# Patient Record
Sex: FEMALE | Race: WHITE | NOT HISPANIC OR LATINO | Employment: FULL TIME | URBAN - NONMETROPOLITAN AREA
[De-identification: names, ages, dates, MRNs, and addresses within clinical notes are randomized per-mention and may not be internally consistent; named-entity substitution may affect disease eponyms.]

---
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
## 2020-02-02 ENCOUNTER — LAB (OUTPATIENT)
Dept: LAB | Facility: HOSPITAL | Age: 20
End: 2020-02-02

## 2020-02-02 ENCOUNTER — TRANSCRIBE ORDERS (OUTPATIENT)
Dept: ADMINISTRATIVE | Facility: HOSPITAL | Age: 20
End: 2020-02-02

## 2020-02-02 DIAGNOSIS — I51.9 MYXEDEMA HEART DISEASE: Primary | ICD-10-CM

## 2020-02-02 DIAGNOSIS — I51.9 MYXEDEMA HEART DISEASE: ICD-10-CM

## 2020-02-02 DIAGNOSIS — E03.9 MYXEDEMA HEART DISEASE: Primary | ICD-10-CM

## 2020-02-02 DIAGNOSIS — E03.9 MYXEDEMA HEART DISEASE: ICD-10-CM

## 2020-02-02 PROCEDURE — 84439 ASSAY OF FREE THYROXINE: CPT

## 2020-02-02 PROCEDURE — 36415 COLL VENOUS BLD VENIPUNCTURE: CPT

## 2020-02-02 PROCEDURE — 84443 ASSAY THYROID STIM HORMONE: CPT | Performed by: NURSE PRACTITIONER

## 2020-02-02 PROCEDURE — 84481 FREE ASSAY (FT-3): CPT

## 2020-02-03 LAB
T3FREE SERPL-MCNC: 2.1 PG/ML (ref 2–4.4)
T4 FREE SERPL-MCNC: 0.96 NG/DL (ref 0.93–1.7)
TSH SERPL DL<=0.05 MIU/L-ACNC: 7.72 UIU/ML (ref 0.27–4.2)

## 2020-02-21 ENCOUNTER — TRANSCRIBE ORDERS (OUTPATIENT)
Dept: ADMINISTRATIVE | Facility: HOSPITAL | Age: 20
End: 2020-02-21

## 2020-02-21 DIAGNOSIS — E03.9 HYPOTHYROIDISM, ADULT: Primary | ICD-10-CM

## 2020-03-09 ENCOUNTER — TRANSCRIBE ORDERS (OUTPATIENT)
Dept: ADMINISTRATIVE | Facility: HOSPITAL | Age: 20
End: 2020-03-09

## 2020-03-09 ENCOUNTER — LAB (OUTPATIENT)
Dept: LAB | Facility: HOSPITAL | Age: 20
End: 2020-03-09

## 2020-03-09 ENCOUNTER — HOSPITAL ENCOUNTER (OUTPATIENT)
Dept: GENERAL RADIOLOGY | Facility: HOSPITAL | Age: 20
Discharge: HOME | End: 2020-03-09
Admitting: NURSE PRACTITIONER

## 2020-03-09 ENCOUNTER — HOSPITAL ENCOUNTER (OUTPATIENT)
Dept: RESPIRATORY THERAPY | Facility: HOSPITAL | Age: 20
Discharge: HOME | End: 2020-03-09

## 2020-03-09 DIAGNOSIS — I51.9 MYXEDEMA HEART DISEASE: ICD-10-CM

## 2020-03-09 DIAGNOSIS — R53.83 TIREDNESS: ICD-10-CM

## 2020-03-09 DIAGNOSIS — R07.9 CHEST PAIN, UNSPECIFIED TYPE: ICD-10-CM

## 2020-03-09 DIAGNOSIS — R63.5 ABNORMAL WEIGHT GAIN: ICD-10-CM

## 2020-03-09 DIAGNOSIS — R10.11 ABDOMINAL PAIN, RIGHT UPPER QUADRANT: ICD-10-CM

## 2020-03-09 DIAGNOSIS — R10.13 ABDOMINAL PAIN, EPIGASTRIC: ICD-10-CM

## 2020-03-09 DIAGNOSIS — E03.9 MYXEDEMA HEART DISEASE: ICD-10-CM

## 2020-03-09 DIAGNOSIS — R19.7 DIARRHEA OF PRESUMED INFECTIOUS ORIGIN: ICD-10-CM

## 2020-03-09 DIAGNOSIS — R00.2 PALPITATIONS: Primary | ICD-10-CM

## 2020-03-09 DIAGNOSIS — R00.2 PALPITATIONS: ICD-10-CM

## 2020-03-09 PROCEDURE — 85045 AUTOMATED RETICULOCYTE COUNT: CPT

## 2020-03-09 PROCEDURE — 93226 XTRNL ECG REC<48 HR SCAN A/R: CPT

## 2020-03-09 PROCEDURE — 82150 ASSAY OF AMYLASE: CPT

## 2020-03-09 PROCEDURE — 71046 X-RAY EXAM CHEST 2 VIEWS: CPT

## 2020-03-09 PROCEDURE — 84436 ASSAY OF TOTAL THYROXINE: CPT

## 2020-03-09 PROCEDURE — 86140 C-REACTIVE PROTEIN: CPT | Performed by: NURSE PRACTITIONER

## 2020-03-09 PROCEDURE — 85025 COMPLETE CBC W/AUTO DIFF WBC: CPT

## 2020-03-09 PROCEDURE — 82746 ASSAY OF FOLIC ACID SERUM: CPT

## 2020-03-09 PROCEDURE — 83540 ASSAY OF IRON: CPT

## 2020-03-09 PROCEDURE — 84443 ASSAY THYROID STIM HORMONE: CPT

## 2020-03-09 PROCEDURE — 84466 ASSAY OF TRANSFERRIN: CPT

## 2020-03-09 PROCEDURE — 71046 X-RAY EXAM CHEST 2 VIEWS: CPT | Performed by: RADIOLOGY

## 2020-03-09 PROCEDURE — 80053 COMPREHEN METABOLIC PANEL: CPT

## 2020-03-09 PROCEDURE — 85652 RBC SED RATE AUTOMATED: CPT | Performed by: NURSE PRACTITIONER

## 2020-03-09 PROCEDURE — 93225 XTRNL ECG REC<48 HRS REC: CPT

## 2020-03-09 PROCEDURE — 82728 ASSAY OF FERRITIN: CPT

## 2020-03-09 PROCEDURE — 36415 COLL VENOUS BLD VENIPUNCTURE: CPT

## 2020-03-09 PROCEDURE — 83690 ASSAY OF LIPASE: CPT

## 2020-03-09 PROCEDURE — 82607 VITAMIN B-12: CPT

## 2020-03-10 LAB
ALBUMIN SERPL-MCNC: 4.6 G/DL (ref 3.5–5.2)
ALBUMIN/GLOB SERPL: 1.8 G/DL
ALP SERPL-CCNC: 76 U/L (ref 39–117)
ALT SERPL W P-5'-P-CCNC: 7 U/L (ref 1–33)
AMYLASE SERPL-CCNC: 37 U/L (ref 28–100)
ANION GAP SERPL CALCULATED.3IONS-SCNC: 11.8 MMOL/L (ref 5–15)
AST SERPL-CCNC: 16 U/L (ref 1–32)
BASOPHILS # BLD AUTO: 0.04 10*3/MM3 (ref 0–0.2)
BASOPHILS NFR BLD AUTO: 0.6 % (ref 0–1.5)
BILIRUB SERPL-MCNC: 0.3 MG/DL (ref 0.2–1.2)
BUN BLD-MCNC: 12 MG/DL (ref 6–20)
BUN/CREAT SERPL: 17.4 (ref 7–25)
CALCIUM SPEC-SCNC: 9.1 MG/DL (ref 8.6–10.5)
CHLORIDE SERPL-SCNC: 105 MMOL/L (ref 98–107)
CO2 SERPL-SCNC: 22.2 MMOL/L (ref 22–29)
CREAT BLD-MCNC: 0.69 MG/DL (ref 0.57–1)
CRP SERPL-MCNC: <0.03 MG/DL (ref 0–0.5)
DEPRECATED RDW RBC AUTO: 38.5 FL (ref 37–54)
EOSINOPHIL # BLD AUTO: 0.05 10*3/MM3 (ref 0–0.4)
EOSINOPHIL NFR BLD AUTO: 0.7 % (ref 0.3–6.2)
ERYTHROCYTE [DISTWIDTH] IN BLOOD BY AUTOMATED COUNT: 12.2 % (ref 12.3–15.4)
ERYTHROCYTE [SEDIMENTATION RATE] IN BLOOD: 2 MM/HR (ref 0–20)
FERRITIN SERPL-MCNC: 15.4 NG/ML (ref 13–150)
FOLATE SERPL-MCNC: 11 NG/ML (ref 4.78–24.2)
GFR SERPL CREATININE-BSD FRML MDRD: 110 ML/MIN/1.73
GLOBULIN UR ELPH-MCNC: 2.5 GM/DL
GLUCOSE BLD-MCNC: 84 MG/DL (ref 65–99)
HCT VFR BLD AUTO: 37.2 % (ref 34–46.6)
HGB BLD-MCNC: 12.1 G/DL (ref 12–15.9)
IMM GRANULOCYTES # BLD AUTO: 0.01 10*3/MM3 (ref 0–0.05)
IMM GRANULOCYTES NFR BLD AUTO: 0.1 % (ref 0–0.5)
IRON 24H UR-MRATE: 31 MCG/DL (ref 37–145)
IRON SATN MFR SERPL: 8 % (ref 20–50)
LIPASE SERPL-CCNC: 26 U/L (ref 13–60)
LYMPHOCYTES # BLD AUTO: 2.02 10*3/MM3 (ref 0.7–3.1)
LYMPHOCYTES NFR BLD AUTO: 29.3 % (ref 19.6–45.3)
MCH RBC QN AUTO: 28.3 PG (ref 26.6–33)
MCHC RBC AUTO-ENTMCNC: 32.5 G/DL (ref 31.5–35.7)
MCV RBC AUTO: 86.9 FL (ref 79–97)
MONOCYTES # BLD AUTO: 0.58 10*3/MM3 (ref 0.1–0.9)
MONOCYTES NFR BLD AUTO: 8.4 % (ref 5–12)
NEUTROPHILS # BLD AUTO: 4.2 10*3/MM3 (ref 1.7–7)
NEUTROPHILS NFR BLD AUTO: 60.9 % (ref 42.7–76)
NRBC BLD AUTO-RTO: 0 /100 WBC (ref 0–0.2)
PLATELET # BLD AUTO: 253 10*3/MM3 (ref 140–450)
PMV BLD AUTO: 10 FL (ref 6–12)
POTASSIUM BLD-SCNC: 3.7 MMOL/L (ref 3.5–5.2)
PROT SERPL-MCNC: 7.1 G/DL (ref 6–8.5)
RBC # BLD AUTO: 4.28 10*6/MM3 (ref 3.77–5.28)
RETICS # AUTO: 0.05 10*6/MM3 (ref 0.02–0.13)
RETICS/RBC NFR AUTO: 1.07 % (ref 0.7–1.9)
SODIUM BLD-SCNC: 139 MMOL/L (ref 136–145)
T4 SERPL-MCNC: 6.58 MCG/DL (ref 4.5–11.7)
TIBC SERPL-MCNC: 408 MCG/DL (ref 298–536)
TRANSFERRIN SERPL-MCNC: 274 MG/DL (ref 200–360)
TSH SERPL DL<=0.05 MIU/L-ACNC: 2.44 UIU/ML (ref 0.27–4.2)
VIT B12 BLD-MCNC: 653 PG/ML (ref 211–946)
WBC NRBC COR # BLD: 6.9 10*3/MM3 (ref 3.4–10.8)

## 2020-03-11 ENCOUNTER — TRANSCRIBE ORDERS (OUTPATIENT)
Dept: ADMINISTRATIVE | Facility: HOSPITAL | Age: 20
End: 2020-03-11

## 2020-03-11 DIAGNOSIS — R10.9 ABDOMINAL PAIN, UNSPECIFIED ABDOMINAL LOCATION: ICD-10-CM

## 2020-03-11 DIAGNOSIS — R00.2 PALPITATIONS: Primary | ICD-10-CM

## 2020-03-13 PROCEDURE — 93227 XTRNL ECG REC<48 HR R&I: CPT | Performed by: INTERNAL MEDICINE

## 2020-03-20 ENCOUNTER — TRANSCRIBE ORDERS (OUTPATIENT)
Dept: ADMINISTRATIVE | Facility: HOSPITAL | Age: 20
End: 2020-03-20

## 2020-03-20 DIAGNOSIS — I49.3 PVC (PREMATURE VENTRICULAR CONTRACTION): Primary | ICD-10-CM

## 2020-03-23 ENCOUNTER — APPOINTMENT (OUTPATIENT)
Dept: ULTRASOUND IMAGING | Facility: HOSPITAL | Age: 20
End: 2020-03-23

## 2020-03-23 ENCOUNTER — APPOINTMENT (OUTPATIENT)
Dept: CARDIOLOGY | Facility: HOSPITAL | Age: 20
End: 2020-03-23

## 2020-03-25 ENCOUNTER — HOSPITAL ENCOUNTER (OUTPATIENT)
Dept: ULTRASOUND IMAGING | Facility: HOSPITAL | Age: 20
Discharge: HOME | End: 2020-03-25

## 2020-03-25 ENCOUNTER — HOSPITAL ENCOUNTER (OUTPATIENT)
Dept: CARDIOLOGY | Facility: HOSPITAL | Age: 20
Discharge: HOME | End: 2020-03-25
Admitting: NURSE PRACTITIONER

## 2020-03-25 DIAGNOSIS — R10.9 ABDOMINAL PAIN, UNSPECIFIED ABDOMINAL LOCATION: ICD-10-CM

## 2020-03-25 DIAGNOSIS — R00.2 PALPITATIONS: ICD-10-CM

## 2020-03-25 LAB
BH CV ECHO MEAS - % IVS THICK: 16.4 %
BH CV ECHO MEAS - % LVPW THICK: 30.1 %
BH CV ECHO MEAS - ACS: 2.4 CM
BH CV ECHO MEAS - AO MAX PG: 7.6 MMHG
BH CV ECHO MEAS - AO MEAN PG: 3 MMHG
BH CV ECHO MEAS - AO ROOT AREA (BSA CORRECTED): 1.8
BH CV ECHO MEAS - AO ROOT AREA: 6.2 CM^2
BH CV ECHO MEAS - AO ROOT DIAM: 2.8 CM
BH CV ECHO MEAS - AO V2 MAX: 138 CM/SEC
BH CV ECHO MEAS - AO V2 MEAN: 85.5 CM/SEC
BH CV ECHO MEAS - AO V2 VTI: 32.7 CM
BH CV ECHO MEAS - BSA(HAYCOCK): 1.5 M^2
BH CV ECHO MEAS - BSA: 1.5 M^2
BH CV ECHO MEAS - BZI_BMI: 22.7 KILOGRAMS/M^2
BH CV ECHO MEAS - BZI_METRIC_HEIGHT: 154.9 CM
BH CV ECHO MEAS - BZI_METRIC_WEIGHT: 54.4 KG
BH CV ECHO MEAS - EDV(CUBED): 75.7 ML
BH CV ECHO MEAS - EDV(MOD-SP4): 49.9 ML
BH CV ECHO MEAS - EDV(TEICH): 79.9 ML
BH CV ECHO MEAS - EF(CUBED): 82.3 %
BH CV ECHO MEAS - EF(MOD-SP4): 70.7 %
BH CV ECHO MEAS - EF(TEICH): 75.4 %
BH CV ECHO MEAS - ESV(CUBED): 13.4 ML
BH CV ECHO MEAS - ESV(MOD-SP4): 14.6 ML
BH CV ECHO MEAS - ESV(TEICH): 19.6 ML
BH CV ECHO MEAS - FS: 43.9 %
BH CV ECHO MEAS - IVS/LVPW: 1
BH CV ECHO MEAS - IVSD: 0.87 CM
BH CV ECHO MEAS - IVSS: 1 CM
BH CV ECHO MEAS - LA DIMENSION: 2.2 CM
BH CV ECHO MEAS - LA/AO: 0.77
BH CV ECHO MEAS - LV DIASTOLIC VOL/BSA (35-75): 32.8 ML/M^2
BH CV ECHO MEAS - LV MASS(C)D: 112.3 GRAMS
BH CV ECHO MEAS - LV MASS(C)DI: 73.8 GRAMS/M^2
BH CV ECHO MEAS - LV MASS(C)S: 64.5 GRAMS
BH CV ECHO MEAS - LV MASS(C)SI: 42.4 GRAMS/M^2
BH CV ECHO MEAS - LV SYSTOLIC VOL/BSA (12-30): 9.6 ML/M^2
BH CV ECHO MEAS - LVIDD: 4.2 CM
BH CV ECHO MEAS - LVIDS: 2.4 CM
BH CV ECHO MEAS - LVLD AP4: 6.3 CM
BH CV ECHO MEAS - LVLS AP4: 5.3 CM
BH CV ECHO MEAS - LVOT AREA (M): 2.5 CM^2
BH CV ECHO MEAS - LVOT AREA: 2.5 CM^2
BH CV ECHO MEAS - LVOT DIAM: 1.8 CM
BH CV ECHO MEAS - LVPWD: 0.85 CM
BH CV ECHO MEAS - LVPWS: 1.1 CM
BH CV ECHO MEAS - MV A MAX VEL: 33.8 CM/SEC
BH CV ECHO MEAS - MV E MAX VEL: 102 CM/SEC
BH CV ECHO MEAS - MV E/A: 3
BH CV ECHO MEAS - PA ACC TIME: 0.2 SEC
BH CV ECHO MEAS - PA PR(ACCEL): -12.8 MMHG
BH CV ECHO MEAS - RAP SYSTOLE: 10 MMHG
BH CV ECHO MEAS - RVSP: 32.5 MMHG
BH CV ECHO MEAS - SI(AO): 132.4 ML/M^2
BH CV ECHO MEAS - SI(CUBED): 41 ML/M^2
BH CV ECHO MEAS - SI(MOD-SP4): 23.2 ML/M^2
BH CV ECHO MEAS - SI(TEICH): 39.6 ML/M^2
BH CV ECHO MEAS - SV(AO): 201.4 ML
BH CV ECHO MEAS - SV(CUBED): 62.3 ML
BH CV ECHO MEAS - SV(MOD-SP4): 35.3 ML
BH CV ECHO MEAS - SV(TEICH): 60.3 ML
BH CV ECHO MEAS - TR MAX VEL: 236.7 CM/SEC
MAXIMAL PREDICTED HEART RATE: 201 BPM
STRESS TARGET HR: 171 BPM

## 2020-03-25 PROCEDURE — 76700 US EXAM ABDOM COMPLETE: CPT | Performed by: RADIOLOGY

## 2020-03-25 PROCEDURE — 93306 TTE W/DOPPLER COMPLETE: CPT | Performed by: INTERNAL MEDICINE

## 2020-03-25 PROCEDURE — 93306 TTE W/DOPPLER COMPLETE: CPT

## 2020-03-25 PROCEDURE — 76700 US EXAM ABDOM COMPLETE: CPT

## 2020-04-20 ENCOUNTER — HOSPITAL ENCOUNTER (OUTPATIENT)
Dept: CARDIOLOGY | Facility: HOSPITAL | Age: 20
Discharge: HOME | End: 2020-04-20

## 2020-04-20 VITALS — BODY MASS INDEX: 22.84 KG/M2 | HEIGHT: 61 IN | WEIGHT: 121 LBS

## 2020-04-20 DIAGNOSIS — I49.3 PVC (PREMATURE VENTRICULAR CONTRACTION): ICD-10-CM

## 2020-04-20 PROCEDURE — 93017 CV STRESS TEST TRACING ONLY: CPT

## 2020-04-21 LAB
BH CV STRESS BP STAGE 1: NORMAL
BH CV STRESS BP STAGE 2: NORMAL
BH CV STRESS BP STAGE 3: NORMAL
BH CV STRESS BP STAGE 4: NORMAL
BH CV STRESS DURATION MIN STAGE 1: 3
BH CV STRESS DURATION MIN STAGE 2: 3
BH CV STRESS DURATION MIN STAGE 3: 3
BH CV STRESS DURATION MIN STAGE 4: 3
BH CV STRESS DURATION SEC STAGE 1: 0
BH CV STRESS DURATION SEC STAGE 2: 0
BH CV STRESS DURATION SEC STAGE 3: 0
BH CV STRESS DURATION SEC STAGE 4: 0
BH CV STRESS GRADE STAGE 1: 10
BH CV STRESS GRADE STAGE 2: 12
BH CV STRESS GRADE STAGE 3: 14
BH CV STRESS GRADE STAGE 4: 16
BH CV STRESS HR STAGE 1: 111
BH CV STRESS HR STAGE 2: 125
BH CV STRESS HR STAGE 3: 153
BH CV STRESS HR STAGE 4: 171
BH CV STRESS METS STAGE 1: 5
BH CV STRESS METS STAGE 2: 7.5
BH CV STRESS METS STAGE 3: 10
BH CV STRESS METS STAGE 4: 13.5
BH CV STRESS PROTOCOL 1: NORMAL
BH CV STRESS RECOVERY BP: NORMAL MMHG
BH CV STRESS RECOVERY HR: 77 BPM
BH CV STRESS SPEED STAGE 1: 1.7
BH CV STRESS SPEED STAGE 2: 2.5
BH CV STRESS SPEED STAGE 3: 3.4
BH CV STRESS SPEED STAGE 4: 4.2
BH CV STRESS STAGE 1: 1
BH CV STRESS STAGE 2: 2
BH CV STRESS STAGE 3: 3
BH CV STRESS STAGE 4: 4
MAXIMAL PREDICTED HEART RATE: 201 BPM
PERCENT MAX PREDICTED HR: 85.07 %
STRESS BASELINE BP: NORMAL MMHG
STRESS BASELINE HR: 68 BPM
STRESS PERCENT HR: 100 %
STRESS POST ESTIMATED WORKLOAD: 12.8 METS
STRESS POST EXERCISE DUR MIN: 11 MIN
STRESS POST EXERCISE DUR SEC: 0 SEC
STRESS POST PEAK BP: NORMAL MMHG
STRESS POST PEAK HR: 171 BPM
STRESS TARGET HR: 171 BPM

## 2020-10-29 ENCOUNTER — TRANSCRIBE ORDERS (OUTPATIENT)
Dept: ADMINISTRATIVE | Facility: HOSPITAL | Age: 20
End: 2020-10-29

## 2020-10-29 DIAGNOSIS — Z01.818 PREOP EXAMINATION: Primary | ICD-10-CM

## 2020-10-30 ENCOUNTER — LAB (OUTPATIENT)
Dept: LAB | Facility: HOSPITAL | Age: 20
End: 2020-10-30

## 2020-10-30 DIAGNOSIS — Z01.818 PREOP EXAMINATION: ICD-10-CM

## 2020-10-30 LAB — SARS-COV-2 RNA RESP QL NAA+PROBE: NOT DETECTED

## 2020-10-30 PROCEDURE — C9803 HOPD COVID-19 SPEC COLLECT: HCPCS

## 2020-10-30 PROCEDURE — U0004 COV-19 TEST NON-CDC HGH THRU: HCPCS | Performed by: INTERNAL MEDICINE

## 2021-02-08 ENCOUNTER — TRANSCRIBE ORDERS (OUTPATIENT)
Dept: ADMINISTRATIVE | Facility: HOSPITAL | Age: 21
End: 2021-02-08

## 2021-02-08 ENCOUNTER — LAB (OUTPATIENT)
Dept: LAB | Facility: HOSPITAL | Age: 21
End: 2021-02-08

## 2021-02-08 DIAGNOSIS — E03.9 HYPOTHYROIDISM, UNSPECIFIED TYPE: ICD-10-CM

## 2021-02-08 DIAGNOSIS — E03.9 HYPOTHYROIDISM, UNSPECIFIED TYPE: Primary | ICD-10-CM

## 2021-02-08 PROCEDURE — 36415 COLL VENOUS BLD VENIPUNCTURE: CPT | Performed by: NURSE PRACTITIONER

## 2021-02-08 PROCEDURE — 84443 ASSAY THYROID STIM HORMONE: CPT

## 2021-02-08 PROCEDURE — 84439 ASSAY OF FREE THYROXINE: CPT | Performed by: NURSE PRACTITIONER

## 2021-02-09 LAB
T4 FREE SERPL-MCNC: 2.76 NG/DL (ref 0.93–1.7)
TSH SERPL DL<=0.05 MIU/L-ACNC: 0.01 UIU/ML (ref 0.27–4.2)

## 2021-03-18 ENCOUNTER — BULK ORDERING (OUTPATIENT)
Dept: CASE MANAGEMENT | Facility: OTHER | Age: 21
End: 2021-03-18

## 2021-03-18 DIAGNOSIS — Z23 IMMUNIZATION DUE: ICD-10-CM

## 2021-10-08 ENCOUNTER — TRANSCRIBE ORDERS (OUTPATIENT)
Dept: ADMINISTRATIVE | Facility: HOSPITAL | Age: 21
End: 2021-10-08

## 2021-10-08 DIAGNOSIS — R51.9 NONINTRACTABLE EPISODIC HEADACHE, UNSPECIFIED HEADACHE TYPE: Primary | ICD-10-CM

## 2021-10-27 ENCOUNTER — HOSPITAL ENCOUNTER (OUTPATIENT)
Dept: MRI IMAGING | Facility: HOSPITAL | Age: 21
End: 2021-10-27

## 2021-11-22 ENCOUNTER — APPOINTMENT (OUTPATIENT)
Dept: MRI IMAGING | Facility: HOSPITAL | Age: 21
End: 2021-11-22

## 2021-12-13 ENCOUNTER — APPOINTMENT (OUTPATIENT)
Dept: MRI IMAGING | Facility: HOSPITAL | Age: 21
End: 2021-12-13

## 2021-12-13 ENCOUNTER — HOSPITAL ENCOUNTER (OUTPATIENT)
Dept: INFUSION THERAPY | Facility: HOSPITAL | Age: 21
Discharge: HOME | End: 2021-12-13
Admitting: NURSE PRACTITIONER

## 2021-12-13 VITALS
SYSTOLIC BLOOD PRESSURE: 118 MMHG | OXYGEN SATURATION: 100 % | RESPIRATION RATE: 18 BRPM | DIASTOLIC BLOOD PRESSURE: 86 MMHG | HEART RATE: 56 BPM | TEMPERATURE: 98.6 F

## 2021-12-13 DIAGNOSIS — U07.1 COVID-19: Primary | ICD-10-CM

## 2021-12-13 PROCEDURE — M0245 HC IV INFUSION, BAMLANIVIMAB AND ETESEVIMAB, 2100 MG: HCPCS | Performed by: NURSE PRACTITIONER

## 2021-12-13 PROCEDURE — 25010000002 INJECTION, BAMLANIVIMAB AND ETESEVIMAB, 2100 MG: Performed by: NURSE PRACTITIONER

## 2021-12-13 RX ORDER — EPINEPHRINE 1 MG/ML
0.3 INJECTION, SOLUTION INTRAMUSCULAR; SUBCUTANEOUS AS NEEDED
OUTPATIENT
Start: 2021-12-13

## 2021-12-13 RX ORDER — DIPHENHYDRAMINE HYDROCHLORIDE 50 MG/ML
50 INJECTION INTRAMUSCULAR; INTRAVENOUS ONCE AS NEEDED
OUTPATIENT
Start: 2021-12-13

## 2021-12-13 RX ORDER — DIPHENHYDRAMINE HCL 50 MG
50 CAPSULE ORAL ONCE AS NEEDED
OUTPATIENT
Start: 2021-12-13

## 2021-12-13 RX ORDER — METHYLPREDNISOLONE SODIUM SUCCINATE 125 MG/2ML
125 INJECTION, POWDER, LYOPHILIZED, FOR SOLUTION INTRAMUSCULAR; INTRAVENOUS AS NEEDED
OUTPATIENT
Start: 2021-12-13

## 2021-12-13 RX ADMIN — SODIUM CHLORIDE: 9 INJECTION, SOLUTION INTRAVENOUS at 13:24

## 2021-12-13 NOTE — NURSING NOTE
Admitted ambulatory to room 4 for monoclonal antibody infusion following a positive Covid test and mild symptoms.

## 2021-12-27 ENCOUNTER — HOSPITAL ENCOUNTER (OUTPATIENT)
Dept: MRI IMAGING | Facility: HOSPITAL | Age: 21
Discharge: HOME | End: 2021-12-27
Admitting: NURSE PRACTITIONER

## 2021-12-27 DIAGNOSIS — R51.9 NONINTRACTABLE EPISODIC HEADACHE, UNSPECIFIED HEADACHE TYPE: ICD-10-CM

## 2021-12-27 PROCEDURE — 70551 MRI BRAIN STEM W/O DYE: CPT

## 2021-12-27 PROCEDURE — 70551 MRI BRAIN STEM W/O DYE: CPT | Performed by: RADIOLOGY

## 2023-12-18 ENCOUNTER — TRANSCRIBE ORDERS (OUTPATIENT)
Dept: ADMINISTRATIVE | Facility: HOSPITAL | Age: 23
End: 2023-12-18
Payer: COMMERCIAL

## 2023-12-18 DIAGNOSIS — N05.9 NEPHRITIC SYNDROME: Primary | ICD-10-CM

## 2023-12-26 ENCOUNTER — HOSPITAL ENCOUNTER (OUTPATIENT)
Dept: ULTRASOUND IMAGING | Facility: HOSPITAL | Age: 23
Discharge: HOME | End: 2023-12-26
Admitting: NURSE PRACTITIONER
Payer: COMMERCIAL

## 2023-12-26 DIAGNOSIS — N05.9 NEPHRITIC SYNDROME: ICD-10-CM

## 2023-12-26 PROCEDURE — 76536 US EXAM OF HEAD AND NECK: CPT

## 2024-03-11 ENCOUNTER — OFFICE VISIT (OUTPATIENT)
Dept: CARDIOLOGY | Facility: CLINIC | Age: 24
End: 2024-03-11
Payer: COMMERCIAL

## 2024-03-11 VITALS
SYSTOLIC BLOOD PRESSURE: 134 MMHG | HEART RATE: 45 BPM | RESPIRATION RATE: 16 BRPM | OXYGEN SATURATION: 99 % | HEIGHT: 61 IN | DIASTOLIC BLOOD PRESSURE: 82 MMHG | BODY MASS INDEX: 25.34 KG/M2 | WEIGHT: 134.2 LBS

## 2024-03-11 DIAGNOSIS — F17.290 VAPING NICOTINE DEPENDENCE, TOBACCO PRODUCT: ICD-10-CM

## 2024-03-11 DIAGNOSIS — Z82.49 FAMILY HISTORY OF CORONARY ARTERY DISEASE: ICD-10-CM

## 2024-03-11 DIAGNOSIS — R00.2 PALPITATIONS: Primary | ICD-10-CM

## 2024-03-11 DIAGNOSIS — R06.02 SHORTNESS OF BREATH: ICD-10-CM

## 2024-03-11 DIAGNOSIS — G47.33 OSA (OBSTRUCTIVE SLEEP APNEA): ICD-10-CM

## 2024-03-11 DIAGNOSIS — I10 ESSENTIAL HYPERTENSION: ICD-10-CM

## 2024-03-11 DIAGNOSIS — R07.2 PRECORDIAL CHEST PAIN: ICD-10-CM

## 2024-03-11 DIAGNOSIS — E03.9 HYPOTHYROIDISM (ACQUIRED): ICD-10-CM

## 2024-03-11 DIAGNOSIS — I49.3 PVC'S (PREMATURE VENTRICULAR CONTRACTIONS): ICD-10-CM

## 2024-03-11 DIAGNOSIS — R00.1 BRADYCARDIA, SINUS: ICD-10-CM

## 2024-03-11 PROCEDURE — 99204 OFFICE O/P NEW MOD 45 MIN: CPT | Performed by: SPECIALIST

## 2024-03-11 PROCEDURE — 93000 ELECTROCARDIOGRAM COMPLETE: CPT | Performed by: SPECIALIST

## 2024-03-11 NOTE — PROGRESS NOTES
Subjective   Initial consultation, Sinus bradycardia, PVCs, chest pain  Tayla Lantigua is a 23 y.o. female who presents to day for Palpitations (Bradycardia, recent holter scanned in system), Chest Pain, and Med Management (verbal).    CHIEF COMPLIANT  Chief Complaint   Patient presents with    Palpitations     Bradycardia, recent holter scanned in system    Chest Pain    Med Management     verbal       Active Problems:  Problem List Items Addressed This Visit          Cardiac and Vasculature    Palpitations - Primary    Relevant Orders    Adult Transthoracic Echo Complete w/ Color, Spectral and Contrast if necessary per protocol    PVC's (premature ventricular contractions)    Relevant Orders    Adult Transthoracic Echo Complete w/ Color, Spectral and Contrast if necessary per protocol    Precordial chest pain    Relevant Orders    Adult Transthoracic Echo Complete w/ Color, Spectral and Contrast if necessary per protocol    Treadmill Stress Test    NMR LipoProfile    Comprehensive Metabolic Panel    Essential hypertension    Bradycardia, sinus    Relevant Orders    TSH       Endocrine and Metabolic    Hypothyroidism (acquired)    Relevant Orders    TSH       Family History    Family history of coronary artery disease    Relevant Orders    NMR LipoProfile       Pulmonary and Pneumonias    Shortness of breath    Relevant Orders    Adult Transthoracic Echo Complete w/ Color, Spectral and Contrast if necessary per protocol       Sleep    KIRILL (obstructive sleep apnea)    Relevant Orders    Home Sleep Study       Other    Vaping nicotine dependence, tobacco product       HPI  HPI  Patient for 4 years and been having intermittent chest pains and is sinus bradycardia she was originally seen by Dr. Hester who told her she that she has PVCs but no intervention was done the patient is more concerned now because she gets frequent chest discomfort mostly about maybe 10 times a week mostly at rest nothing increasing getting worse  but she still headache and little bit of shortness of breath in between when she is not having chest pain she is asymptomatic except for palpitation when she feels her heart skipping the patient also said that she is relatively active with she is active she does not have any cardiac symptoms, she does vape but she has also borderline hypertension no history of diabetes or asthma unknown history of hyperlipidemia she states she is fatigued she is tired she is sleepy and she snores a little bit at night sometimes family history wise both grandparents on both sides with coronary artery disease including PCI's and pacemakers both the also would have one of them had a heart attack at age 42  PRIOR MEDS  Current Outpatient Medications on File Prior to Visit   Medication Sig Dispense Refill    levothyroxine (SYNTHROID, LEVOTHROID) 88 MCG tablet Take 1 tablet by mouth Daily. 30 tablet 5    amoxicillin-clavulanate (AUGMENTIN) 500-125 MG per tablet Take 1 tablet by mouth Every 12 (Twelve) Hours for 10 days. 20 tablet 0    azithromycin (ZITHROMAX) 250 MG tablet TAKE 2 TABLETS BY MOUTH TODAY AND THEN 1 TABLET BY MOUTH DAILY X4DAYS. 6 tablet 0    azithromycin (ZITHROMAX) 250 MG tablet Take 2 tablets by mouth the first day, then 1 tablet daily for 4 days. 6 tablet 0    brompheniramine-pseudoephedrine-DM 30-2-10 MG/5ML syrup Take 1 teaspoonful (5 mL) by mouth Every 6 (Six) Hours As Needed for cough. 120 mL 0    brompheniramine-pseudoephedrine-DM 30-2-10 MG/5ML syrup Take 5 mL by mouth Every 6 (Six) Hours As Needed for cough and congestion. 240 mL 0    dicyclomine (BENTYL) 20 MG tablet Take 1 tablet by mouth before meals and at bedtime 120 tablet 1    ferrous sulfate 325 (65 FE) MG tablet Take 1 tablet by mouth daily 30 tablet 1    ferrous sulfate 325 (65 FE) MG tablet Take 1 tablet by mouth 2 times per day 60 tablet 1    levothyroxine (Synthroid) 175 MCG tablet Take 1 tablet (175 mcg) by mouth once daily. 30 tablet 0     levothyroxine (SYNTHROID, LEVOTHROID) 100 MCG tablet Take 1 tablet by mouth daily. 30 tablet 2    levothyroxine (SYNTHROID, LEVOTHROID) 100 MCG tablet Take 1 tablet by mouth daily 30 tablet 5    levothyroxine (SYNTHROID, LEVOTHROID) 112 MCG tablet Take 1 tablet(s) by mouth daily 30 tablet 5    levothyroxine (SYNTHROID, LEVOTHROID) 125 MCG tablet Take 1 tablet by mouth Daily to treat hypothyroydism. 30 tablet 5    levothyroxine (SYNTHROID, LEVOTHROID) 125 MCG tablet Take 1 tablet by mouth Daily. 30 tablet 5    levothyroxine (SYNTHROID, LEVOTHROID) 137 MCG tablet Take 1 tablet by mouth Daily. 30 tablet 5    levothyroxine (SYNTHROID, LEVOTHROID) 150 MCG tablet Take 1 tablet by mouth Daily. 30 tablet 1    levothyroxine (SYNTHROID, LEVOTHROID) 150 MCG tablet Take 1 tablet by mouth Daily. 90 tablet 1    levothyroxine (SYNTHROID, LEVOTHROID) 150 MCG tablet take 1 tablet by mouth once daily.  Brand name only. 30 tablet 3    Synthroid 150 MCG tablet Take 1 tablet by mouth on Saturday and Sunday (Brand Name Only).  Alternate with 137 mcg Monday-Friday. 30 tablet 1    metaxalone (SKELAXIN) 800 MG tablet Take 1/2-1 tablet by mouth two times daily as needed for muscle spasms 60 tablet 0    naproxen (NAPROSYN) 500 MG tablet Take 1/2 tablet by mouth 2 (two) times a day as needed for pain. 30 tablet 0    norethindrone-ethinyl estradiol (JUNEL FE 1/20) 1-20 MG-MCG per tablet take 1 tablet by mouth daily 28 tablet 4    norethindrone-ethinyl estradiol FE (JUNEL FE 1/20) 1-20 MG-MCG per tablet Take 1 tablet by mouth every day 28 tablet 4    norethindrone-ethinyl estradiol-iron (MICROGESTIN FE1.5/30) 1.5-30 MG-MCG tablet Take 1 tablet by mouth Daily. 28 tablet 8    norgestimate-ethinyl estradiol (SPRINTEC 28) 0.25-35 MG-MCG per tablet Take 1 tablet by mouth Daily. 28 tablet 7    SYNTHROID 112 MCG tablet Take 1 tablet by mouth daily. 90 tablet 1     No current facility-administered medications on file prior to visit.  "      ALLERGIES  Bactrim [sulfamethoxazole-trimethoprim]    HISTORY  Past Medical History:   Diagnosis Date    Disease of thyroid gland     Essential hypertension 3/11/2024       Social History     Socioeconomic History    Marital status: Single   Tobacco Use    Smoking status: Former    Tobacco comments:     vapes   Substance and Sexual Activity    Alcohol use: Never    Drug use: Never       Family History   Problem Relation Age of Onset    Heart disease Maternal Grandfather     Heart attack Paternal Grandfather     Heart disease Paternal Grandfather        Review of Systems   Respiratory:  Positive for chest tightness and shortness of breath. Negative for apnea, cough, choking, wheezing and stridor.    Cardiovascular:  Positive for palpitations. Negative for chest pain and leg swelling.       Objective     VITALS: /82 (BP Location: Right arm, Cuff Size: Adult)   Pulse (!) 45   Resp 16   Ht 154.9 cm (61\")   Wt 60.9 kg (134 lb 3.2 oz)   SpO2 99%   BMI 25.36 kg/m²     LABS:   Lab Results (most recent)       None            IMAGING:   US Thyroid    Result Date: 12/26/2023    No acute thyroid findings.   This report was finalized on 12/26/2023 5:18 PM by Dr. Derrell Falk MD.        EXAM:  Physical Exam  Vitals reviewed.   Constitutional:       Appearance: She is well-developed.   HENT:      Head: Normocephalic and atraumatic.   Eyes:      Pupils: Pupils are equal, round, and reactive to light.   Neck:      Thyroid: No thyromegaly.      Vascular: No JVD.   Cardiovascular:      Rate and Rhythm: Normal rate and regular rhythm.      Heart sounds: Normal heart sounds. No murmur heard.     No friction rub. No gallop.   Pulmonary:      Effort: Pulmonary effort is normal. No respiratory distress.      Breath sounds: Normal breath sounds. No stridor. No wheezing or rales.   Chest:      Chest wall: No tenderness.   Musculoskeletal:         General: No tenderness or deformity.      Cervical back: Neck supple. "   Skin:     General: Skin is warm and dry.   Neurological:      Mental Status: She is alert and oriented to person, place, and time.      Cranial Nerves: No cranial nerve deficit.      Coordination: Coordination normal.         Procedure     ECG 12 Lead    Date/Time: 3/11/2024 12:21 PM  Performed by: Pam Diop MD    Authorized by: Pam Diop MD      EKG: Sinus bradycardia heart rate 53 bpm, otherwise with RSR pattern otherwise no other abnormalities, no previous EKGs available for comparison       Assessment & Plan     Diagnoses and all orders for this visit:    1. Palpitations (Primary)  -     Adult Transthoracic Echo Complete w/ Color, Spectral and Contrast if necessary per protocol; Future    2. PVC's (premature ventricular contractions)  -     Adult Transthoracic Echo Complete w/ Color, Spectral and Contrast if necessary per protocol; Future    3. Precordial chest pain  -     Adult Transthoracic Echo Complete w/ Color, Spectral and Contrast if necessary per protocol; Future  -     Treadmill Stress Test; Future  -     NMR LipoProfile; Future  -     Comprehensive Metabolic Panel; Future    4. Shortness of breath  -     Adult Transthoracic Echo Complete w/ Color, Spectral and Contrast if necessary per protocol; Future    5. Family history of coronary artery disease  -     NMR LipoProfile; Future    6. Vaping nicotine dependence, tobacco product    7. KIRILL (obstructive sleep apnea)  -     Home Sleep Study; Future    8. Essential hypertension    9. Hypothyroidism (acquired)  -     TSH; Future    10. Bradycardia, sinus  -     TSH; Future    Other orders  -     ECG 12 Lead    1.  The patient had Holter monitor on 2/7/2024 I reviewed that she has very prominent sinus arrhythmia as well as occasional premature ventricular complexes with bigeminy's her average heart rate was 79 but at during sleep hours will go down to 38 bpm I am concerned about sleep apnea also she has a history of hypothyroidism some going to  check her TSH and going to go ahead and do a sleep study for assessment  2.  Regarding the chest pain and what to do treadmill exercise stress testing partly also to assess for her response to exercise with a heart rate  3.  Negative echocardiogram to assess cardiac function wall motion and valve morphology  4.  Her blood pressure is borderline we will monitor for now as is her first visit  5.  She has strong family history of coronary artery disease so I am going to get lipid profile  6.  I strongly advised her to quit vaping she has been vaping now for almost a year    Return in about 4 weeks (around 4/8/2024).               MEDS ORDERED DURING VISIT:  No orders of the defined types were placed in this encounter.      As always, Droita Chambers, JULI  I appreciate very much the opportunity to participate in the cardiovascular care of your patients. Please do not hesitate to call me with any questions with regards to Tayla Lantigua evaluation and management.         This document has been electronically signed by Pam Diop MD  March 11, 2024 13:20 EDT    This note is dictated utilizing voice recognition software.

## 2024-03-13 ENCOUNTER — PATIENT ROUNDING (BHMG ONLY) (OUTPATIENT)
Dept: CARDIOLOGY | Facility: CLINIC | Age: 24
End: 2024-03-13
Payer: COMMERCIAL

## 2024-03-13 NOTE — PROGRESS NOTES
A [facility] message has been sent to the patient for patient rounding for [facility].

## 2024-04-08 ENCOUNTER — HOSPITAL ENCOUNTER (OUTPATIENT)
Dept: CARDIOLOGY | Facility: HOSPITAL | Age: 24
Discharge: HOME | End: 2024-04-08
Payer: COMMERCIAL

## 2024-04-08 ENCOUNTER — TRANSCRIBE ORDERS (OUTPATIENT)
Dept: ADMINISTRATIVE | Facility: HOSPITAL | Age: 24
End: 2024-04-08
Payer: COMMERCIAL

## 2024-04-08 ENCOUNTER — LAB (OUTPATIENT)
Dept: LAB | Facility: HOSPITAL | Age: 24
End: 2024-04-08
Payer: COMMERCIAL

## 2024-04-08 DIAGNOSIS — R07.2 PRECORDIAL CHEST PAIN: ICD-10-CM

## 2024-04-08 DIAGNOSIS — Z82.49 FAMILY HISTORY OF ISCHEMIC HEART DISEASE: Primary | ICD-10-CM

## 2024-04-08 DIAGNOSIS — Z82.49 FAMILY HISTORY OF ISCHEMIC HEART DISEASE: ICD-10-CM

## 2024-04-08 DIAGNOSIS — Z82.49 FAMILY HISTORY OF CORONARY ARTERY DISEASE: ICD-10-CM

## 2024-04-08 LAB
ALBUMIN SERPL-MCNC: 4.9 G/DL (ref 3.5–5.2)
ALBUMIN/GLOB SERPL: 1.7 G/DL
ALP SERPL-CCNC: 89 U/L (ref 39–117)
ALT SERPL W P-5'-P-CCNC: 9 U/L (ref 1–33)
ANION GAP SERPL CALCULATED.3IONS-SCNC: 20.5 MMOL/L (ref 5–15)
AST SERPL-CCNC: 19 U/L (ref 1–32)
BH CV STRESS BP STAGE 1: NORMAL
BH CV STRESS BP STAGE 2: NORMAL
BH CV STRESS BP STAGE 3: NORMAL
BH CV STRESS DURATION MIN STAGE 1: 3
BH CV STRESS DURATION MIN STAGE 2: 3
BH CV STRESS DURATION MIN STAGE 3: 3
BH CV STRESS DURATION MIN STAGE 4: 1
BH CV STRESS DURATION SEC STAGE 1: 0
BH CV STRESS DURATION SEC STAGE 2: 0
BH CV STRESS DURATION SEC STAGE 3: 0
BH CV STRESS DURATION SEC STAGE 4: 45
BH CV STRESS GRADE STAGE 1: 10
BH CV STRESS GRADE STAGE 2: 12
BH CV STRESS GRADE STAGE 3: 14
BH CV STRESS GRADE STAGE 4: 16
BH CV STRESS HR STAGE 1: 101
BH CV STRESS HR STAGE 2: 115
BH CV STRESS HR STAGE 3: 141
BH CV STRESS HR STAGE 4: 169
BH CV STRESS METS STAGE 1: 5
BH CV STRESS METS STAGE 2: 7.5
BH CV STRESS METS STAGE 3: 10
BH CV STRESS METS STAGE 4: 13.5
BH CV STRESS PROTOCOL 1: NORMAL
BH CV STRESS RECOVERY BP: NORMAL MMHG
BH CV STRESS RECOVERY HR: 91 BPM
BH CV STRESS SPEED STAGE 1: 1.7
BH CV STRESS SPEED STAGE 2: 2.5
BH CV STRESS SPEED STAGE 3: 3.4
BH CV STRESS SPEED STAGE 4: 4.2
BH CV STRESS STAGE 1: 1
BH CV STRESS STAGE 2: 2
BH CV STRESS STAGE 3: 3
BH CV STRESS STAGE 4: 4
BILIRUB SERPL-MCNC: 0.6 MG/DL (ref 0–1.2)
BUN SERPL-MCNC: 12 MG/DL (ref 6–20)
BUN/CREAT SERPL: 12.1 (ref 7–25)
CALCIUM SPEC-SCNC: 9 MG/DL (ref 8.6–10.5)
CHLORIDE SERPL-SCNC: 105 MMOL/L (ref 98–107)
CO2 SERPL-SCNC: 15.5 MMOL/L (ref 22–29)
CREAT SERPL-MCNC: 0.99 MG/DL (ref 0.57–1)
EGFRCR SERPLBLD CKD-EPI 2021: 82.3 ML/MIN/1.73
GLOBULIN UR ELPH-MCNC: 2.9 GM/DL
GLUCOSE SERPL-MCNC: 106 MG/DL (ref 65–99)
MAXIMAL PREDICTED HEART RATE: 197 BPM
PERCENT MAX PREDICTED HR: 85.79 %
POTASSIUM SERPL-SCNC: 3.9 MMOL/L (ref 3.5–5.2)
PROT SERPL-MCNC: 7.8 G/DL (ref 6–8.5)
SODIUM SERPL-SCNC: 141 MMOL/L (ref 136–145)
STRESS BASELINE BP: NORMAL MMHG
STRESS BASELINE HR: 85 BPM
STRESS PERCENT HR: 101 %
STRESS POST ESTIMATED WORKLOAD: 13.4 METS
STRESS POST EXERCISE DUR MIN: 10 MIN
STRESS POST EXERCISE DUR SEC: 45 SEC
STRESS POST PEAK BP: NORMAL MMHG
STRESS POST PEAK HR: 169 BPM
STRESS TARGET HR: 167 BPM
TSH SERPL DL<=0.05 MIU/L-ACNC: 3.73 UIU/ML (ref 0.27–4.2)

## 2024-04-08 PROCEDURE — 80053 COMPREHEN METABOLIC PANEL: CPT

## 2024-04-08 PROCEDURE — 83704 LIPOPROTEIN BLD QUAN PART: CPT

## 2024-04-08 PROCEDURE — 93017 CV STRESS TEST TRACING ONLY: CPT

## 2024-04-08 PROCEDURE — 36415 COLL VENOUS BLD VENIPUNCTURE: CPT

## 2024-04-08 PROCEDURE — 84443 ASSAY THYROID STIM HORMONE: CPT

## 2024-04-08 PROCEDURE — 93018 CV STRESS TEST I&R ONLY: CPT | Performed by: SPECIALIST

## 2024-04-08 PROCEDURE — 80061 LIPID PANEL: CPT

## 2024-04-09 LAB
CHOLEST SERPL-MCNC: 164 MG/DL (ref 100–199)
HDL SERPL-SCNC: 34.8 UMOL/L
HDLC SERPL-MCNC: 60 MG/DL
LDL SERPL QN: 21 NM
LDL SERPL-SCNC: 1035 NMOL/L
LDL SMALL SERPL-SCNC: 430 NMOL/L
LDLC SERPL CALC-MCNC: 94 MG/DL (ref 0–99)
TRIGL SERPL-MCNC: 47 MG/DL (ref 0–149)

## 2024-04-15 DIAGNOSIS — G47.33 OSA (OBSTRUCTIVE SLEEP APNEA): ICD-10-CM

## 2024-04-29 ENCOUNTER — HOSPITAL ENCOUNTER (OUTPATIENT)
Dept: CARDIOLOGY | Facility: HOSPITAL | Age: 24
Discharge: HOME | End: 2024-04-29
Admitting: SPECIALIST
Payer: COMMERCIAL

## 2024-04-29 DIAGNOSIS — I49.3 PVC'S (PREMATURE VENTRICULAR CONTRACTIONS): ICD-10-CM

## 2024-04-29 DIAGNOSIS — R06.02 SHORTNESS OF BREATH: ICD-10-CM

## 2024-04-29 DIAGNOSIS — R00.2 PALPITATIONS: ICD-10-CM

## 2024-04-29 DIAGNOSIS — R07.2 PRECORDIAL CHEST PAIN: ICD-10-CM

## 2024-04-29 LAB
BH CV ECHO MEAS - ACS: 1.7 CM
BH CV ECHO MEAS - AO MAX PG: 8.4 MMHG
BH CV ECHO MEAS - AO MEAN PG: 4 MMHG
BH CV ECHO MEAS - AO ROOT DIAM: 2.6 CM
BH CV ECHO MEAS - AO V2 MAX: 145 CM/SEC
BH CV ECHO MEAS - AO V2 VTI: 35.4 CM
BH CV ECHO MEAS - EDV(CUBED): 91.1 ML
BH CV ECHO MEAS - EDV(MOD-SP4): 66 ML
BH CV ECHO MEAS - EF(MOD-SP4): 63.8 %
BH CV ECHO MEAS - ESV(CUBED): 29.8 ML
BH CV ECHO MEAS - ESV(MOD-SP4): 23.9 ML
BH CV ECHO MEAS - FS: 31.1 %
BH CV ECHO MEAS - IVS/LVPW: 1.17 CM
BH CV ECHO MEAS - IVSD: 0.7 CM
BH CV ECHO MEAS - LA DIMENSION: 3.3 CM
BH CV ECHO MEAS - LAT PEAK E' VEL: 20.1 CM/SEC
BH CV ECHO MEAS - LV DIASTOLIC VOL/BSA (35-75): 41.4 CM2
BH CV ECHO MEAS - LV MASS(C)D: 87.1 GRAMS
BH CV ECHO MEAS - LV SYSTOLIC VOL/BSA (12-30): 15 CM2
BH CV ECHO MEAS - LVIDD: 4.5 CM
BH CV ECHO MEAS - LVIDS: 3.1 CM
BH CV ECHO MEAS - LVOT AREA: 3.1 CM2
BH CV ECHO MEAS - LVOT DIAM: 2 CM
BH CV ECHO MEAS - LVPWD: 0.6 CM
BH CV ECHO MEAS - MED PEAK E' VEL: 12.8 CM/SEC
BH CV ECHO MEAS - MV A MAX VEL: 55.4 CM/SEC
BH CV ECHO MEAS - MV E MAX VEL: 112 CM/SEC
BH CV ECHO MEAS - MV E/A: 2.02
BH CV ECHO MEAS - PA ACC TIME: 0.18 SEC
BH CV ECHO MEAS - RAP SYSTOLE: 10 MMHG
BH CV ECHO MEAS - RVSP: 32.7 MMHG
BH CV ECHO MEAS - SV(MOD-SP4): 42.1 ML
BH CV ECHO MEAS - SVI(MOD-SP4): 26.4 ML/M2
BH CV ECHO MEAS - TAPSE (>1.6): 2.38 CM
BH CV ECHO MEAS - TR MAX PG: 22.7 MMHG
BH CV ECHO MEAS - TR MAX VEL: 238 CM/SEC
BH CV ECHO MEASUREMENTS AVERAGE E/E' RATIO: 6.81
LEFT ATRIUM VOLUME INDEX: 19.1 ML/M2

## 2024-04-29 PROCEDURE — 93306 TTE W/DOPPLER COMPLETE: CPT | Performed by: SPECIALIST

## 2024-04-29 PROCEDURE — 93306 TTE W/DOPPLER COMPLETE: CPT

## 2024-05-06 ENCOUNTER — OFFICE VISIT (OUTPATIENT)
Dept: CARDIOLOGY | Facility: CLINIC | Age: 24
End: 2024-05-06
Payer: COMMERCIAL

## 2024-05-06 VITALS
DIASTOLIC BLOOD PRESSURE: 84 MMHG | OXYGEN SATURATION: 98 % | SYSTOLIC BLOOD PRESSURE: 131 MMHG | HEART RATE: 57 BPM | WEIGHT: 133 LBS | BODY MASS INDEX: 25.11 KG/M2 | HEIGHT: 61 IN

## 2024-05-06 DIAGNOSIS — R07.2 PRECORDIAL CHEST PAIN: ICD-10-CM

## 2024-05-06 DIAGNOSIS — I49.3 PVC'S (PREMATURE VENTRICULAR CONTRACTIONS): Primary | ICD-10-CM

## 2024-05-06 PROCEDURE — 99214 OFFICE O/P EST MOD 30 MIN: CPT | Performed by: NURSE PRACTITIONER

## 2024-05-13 ENCOUNTER — OFFICE VISIT (OUTPATIENT)
Dept: ENDOCRINOLOGY | Facility: CLINIC | Age: 24
End: 2024-05-13
Payer: COMMERCIAL

## 2024-05-13 VITALS
WEIGHT: 135.2 LBS | OXYGEN SATURATION: 98 % | BODY MASS INDEX: 25.53 KG/M2 | HEIGHT: 61 IN | HEART RATE: 80 BPM | DIASTOLIC BLOOD PRESSURE: 81 MMHG | SYSTOLIC BLOOD PRESSURE: 138 MMHG

## 2024-05-13 DIAGNOSIS — E03.8 HYPOTHYROIDISM DUE TO HASHIMOTO'S THYROIDITIS: Primary | ICD-10-CM

## 2024-05-13 DIAGNOSIS — E06.3 HYPOTHYROIDISM DUE TO HASHIMOTO'S THYROIDITIS: Primary | ICD-10-CM

## 2024-05-13 LAB
HBA1C MFR BLD: 5.3 % (ref 4.8–5.6)
T3FREE SERPL-MCNC: 2.53 PG/ML (ref 2–4.4)
T4 FREE SERPL-MCNC: 1.19 NG/DL (ref 0.93–1.7)
TSH SERPL DL<=0.05 MIU/L-ACNC: 7.5 UIU/ML (ref 0.27–4.2)

## 2024-05-13 PROCEDURE — 99213 OFFICE O/P EST LOW 20 MIN: CPT | Performed by: NURSE PRACTITIONER

## 2024-05-13 PROCEDURE — 84481 FREE ASSAY (FT-3): CPT | Performed by: NURSE PRACTITIONER

## 2024-05-13 PROCEDURE — 83036 HEMOGLOBIN GLYCOSYLATED A1C: CPT | Performed by: NURSE PRACTITIONER

## 2024-05-13 PROCEDURE — 84439 ASSAY OF FREE THYROXINE: CPT | Performed by: NURSE PRACTITIONER

## 2024-05-13 PROCEDURE — 84443 ASSAY THYROID STIM HORMONE: CPT | Performed by: NURSE PRACTITIONER

## 2024-05-13 NOTE — PROGRESS NOTES
No chief complaint on file.       Referring Provider  Dorita Chambers APRN HPI   Tayla Lantigua is a 23 y.o. female had no chief complaint listed for this encounter.   Seen as a new patient.  Hypothyroidism due to Hashimoto's Thyroiditis.    She has had thyroid problems since elementary school.  She was noted at that time to have labs at that time and was noted to have TSH over 400.  Was seen by  Es Doe and was confirmed.  She has been on medication since then.  She is continued on medication since the age of 7-8.  She does have Hashmioto's thyroiditis.  She's is currently taking Synthroid 88 mcg QD. She is taking this regularly without missing doses.  Denies any changes to her neck without compressive s/sx's.  She had an US Thyroid 12/2023  FINDINGS:    LEFT THYROID LOBE:  Unremarkable.  No enlarged or calcified nodules.    RIGHT THYROID LOBE:  Unremarkable.  No enlarged or calcified nodules.    ISTHMUS:  Unremarkable.  No enlarged or calcified nodules.    LYMPH NODES:  Unremarkable.  No lymphadenopathy.  IMPRESSION:  No acute thyroid findings.    [place]irth state:[place]  Previous history of radiation to face/neck: none  Consuming foods high in iodine: none  Family history of thyroid complications:maternal grandmother, maternal grandfather, paternal grandmother, mother had a pituitary tumor.    The following portions of the patient's history were reviewed and updated as appropriate: allergies, current medications, past family history, past medical history, past social history, past surgical history, and problem list.    Past Medical History:   Diagnosis Date    Disease of thyroid gland     Essential hypertension 3/11/2024     History reviewed. No pertinent surgical history.   Family History   Problem Relation Age of Onset    Heart disease Maternal Grandfather     Heart attack Paternal Grandfather     Heart disease Paternal Grandfather       Social History     Socioeconomic History    Marital status: Single   Tobacco  Use    Smoking status: Former     Passive exposure: Past    Tobacco comments:     vapes   Vaping Use    Vaping status: Every Day    Substances: Nicotine, Flavoring    Devices: Pre-filled or refillable cartridge    Passive vaping exposure: Yes   Substance and Sexual Activity    Alcohol use: Never    Drug use: Never    Sexual activity: Defer      Allergies   Allergen Reactions    Bactrim [Sulfamethoxazole-Trimethoprim] Rash      Current Outpatient Medications on File Prior to Visit   Medication Sig Dispense Refill    levothyroxine (SYNTHROID, LEVOTHROID) 88 MCG tablet Take 1 tablet by mouth Daily. 30 tablet 5    amoxicillin-clavulanate (AUGMENTIN) 500-125 MG per tablet Take 1 tablet by mouth Every 12 (Twelve) Hours for 10 days. (Patient not taking: Reported on 5/6/2024) 20 tablet 0    azithromycin (ZITHROMAX) 250 MG tablet TAKE 2 TABLETS BY MOUTH TODAY AND THEN 1 TABLET BY MOUTH DAILY X4DAYS. (Patient not taking: Reported on 5/6/2024) 6 tablet 0    azithromycin (ZITHROMAX) 250 MG tablet Take 2 tablets by mouth the first day, then 1 tablet daily for 4 days. (Patient not taking: Reported on 5/6/2024) 6 tablet 0    brompheniramine-pseudoephedrine-DM 30-2-10 MG/5ML syrup Take 1 teaspoonful (5 mL) by mouth Every 6 (Six) Hours As Needed for cough. (Patient not taking: Reported on 5/6/2024) 120 mL 0    brompheniramine-pseudoephedrine-DM 30-2-10 MG/5ML syrup Take 5 mL by mouth Every 6 (Six) Hours As Needed for cough and congestion. (Patient not taking: Reported on 5/6/2024) 240 mL 0    dicyclomine (BENTYL) 20 MG tablet Take 1 tablet by mouth before meals and at bedtime (Patient not taking: Reported on 5/6/2024) 120 tablet 1    ferrous sulfate 325 (65 FE) MG tablet Take 1 tablet by mouth daily (Patient not taking: Reported on 5/6/2024) 30 tablet 1    ferrous sulfate 325 (65 FE) MG tablet Take 1 tablet by mouth 2 times per day (Patient not taking: Reported on 5/6/2024) 60 tablet 1    levothyroxine (Synthroid) 175 MCG tablet  Take 1 tablet (175 mcg) by mouth once daily. (Patient not taking: Reported on 5/6/2024) 30 tablet 0    levothyroxine (SYNTHROID, LEVOTHROID) 100 MCG tablet Take 1 tablet by mouth daily. (Patient not taking: Reported on 5/6/2024) 30 tablet 2    levothyroxine (SYNTHROID, LEVOTHROID) 100 MCG tablet Take 1 tablet by mouth daily (Patient not taking: Reported on 5/6/2024) 30 tablet 5    levothyroxine (SYNTHROID, LEVOTHROID) 112 MCG tablet Take 1 tablet(s) by mouth daily (Patient not taking: Reported on 5/6/2024) 30 tablet 5    levothyroxine (SYNTHROID, LEVOTHROID) 125 MCG tablet Take 1 tablet by mouth Daily to treat hypothyroydism. (Patient not taking: Reported on 5/6/2024) 30 tablet 5    levothyroxine (SYNTHROID, LEVOTHROID) 125 MCG tablet Take 1 tablet by mouth Daily. (Patient not taking: Reported on 5/6/2024) 30 tablet 5    levothyroxine (SYNTHROID, LEVOTHROID) 137 MCG tablet Take 1 tablet by mouth Daily. (Patient not taking: Reported on 5/6/2024) 30 tablet 5    levothyroxine (SYNTHROID, LEVOTHROID) 150 MCG tablet Take 1 tablet by mouth Daily. (Patient not taking: Reported on 5/6/2024) 30 tablet 1    levothyroxine (SYNTHROID, LEVOTHROID) 150 MCG tablet Take 1 tablet by mouth Daily. (Patient not taking: Reported on 5/6/2024) 90 tablet 1    levothyroxine (SYNTHROID, LEVOTHROID) 150 MCG tablet take 1 tablet by mouth once daily.  Brand name only. (Patient not taking: Reported on 5/6/2024) 30 tablet 3    Synthroid 150 MCG tablet Take 1 tablet by mouth on Saturday and Sunday (Brand Name Only).  Alternate with 137 mcg Monday-Friday. (Patient not taking: Reported on 5/6/2024) 30 tablet 1    metaxalone (SKELAXIN) 800 MG tablet Take 1/2-1 tablet by mouth two times daily as needed for muscle spasms (Patient not taking: Reported on 5/6/2024) 60 tablet 0    naproxen (NAPROSYN) 500 MG tablet Take 1/2 tablet by mouth 2 (two) times a day as needed for pain. (Patient not taking: Reported on 5/6/2024) 30 tablet 0     "norethindrone-ethinyl estradiol (JUNEL FE 1/20) 1-20 MG-MCG per tablet take 1 tablet by mouth daily (Patient not taking: Reported on 5/6/2024) 28 tablet 4    norethindrone-ethinyl estradiol FE (JUNEL FE 1/20) 1-20 MG-MCG per tablet Take 1 tablet by mouth every day (Patient not taking: Reported on 5/6/2024) 28 tablet 4    norethindrone-ethinyl estradiol-iron (MICROGESTIN FE1.5/30) 1.5-30 MG-MCG tablet Take 1 tablet by mouth Daily. (Patient not taking: Reported on 5/6/2024) 28 tablet 8    norgestimate-ethinyl estradiol (SPRINTEC 28) 0.25-35 MG-MCG per tablet Take 1 tablet by mouth Daily. (Patient not taking: Reported on 5/6/2024) 28 tablet 7    SYNTHROID 112 MCG tablet Take 1 tablet by mouth daily. (Patient not taking: Reported on 5/6/2024) 90 tablet 1     No current facility-administered medications on file prior to visit.      Review of Systems   Constitutional:  Positive for diaphoresis, fatigue and unexpected weight gain.   Eyes:  Positive for blurred vision.   Endocrine: Positive for cold intolerance.   Genitourinary:  Negative for menstrual problem.   Musculoskeletal:  Positive for myalgias.   Skin:  Negative for dry skin.        Hair loss, no nail changes.   Psychiatric/Behavioral:  Negative for sleep disturbance.    All other systems reviewed and are negative.    /81 (BP Location: Right arm, Patient Position: Sitting, Cuff Size: Small Adult)   Pulse 80   Ht 154.9 cm (60.98\")   Wt 61.3 kg (135 lb 3.2 oz)   SpO2 98%   BMI 25.56 kg/m²      Physical Exam  Vitals reviewed.   Constitutional:       Appearance: Normal appearance.   Eyes:      Extraocular Movements: Extraocular movements intact.   Neck:      Thyroid: No thyroid mass, thyromegaly or thyroid tenderness.   Cardiovascular:      Rate and Rhythm: Normal rate.   Pulmonary:      Effort: Pulmonary effort is normal.   Neurological:      General: No focal deficit present.      Mental Status: She is alert and oriented to person, place, and time. " "  Psychiatric:         Mood and Affect: Mood normal.         Behavior: Behavior normal.         Thought Content: Thought content normal.         Judgment: Judgment normal.       Labs/Imaging  CMP  Lab Results   Component Value Date    GLUCOSE 106 (H) 04/08/2024    BUN 12 04/08/2024    CREATININE 0.99 04/08/2024    EGFRIFNONA 110 03/09/2020    BCR 12.1 04/08/2024    K 3.9 04/08/2024    CO2 15.5 (L) 04/08/2024    CALCIUM 9.0 04/08/2024    ALBUMIN 4.9 04/08/2024    AST 19 04/08/2024    ALT 9 04/08/2024        CBC w/DIFF   Lab Results   Component Value Date    WBC 6.90 03/09/2020    RBC 4.28 03/09/2020    HGB 12.1 03/09/2020    HCT 37.2 03/09/2020    MCV 86.9 03/09/2020    MCH 28.3 03/09/2020    MCHC 32.5 03/09/2020    RDW 12.2 (L) 03/09/2020    RDWSD 38.5 03/09/2020    MPV 10.0 03/09/2020     03/09/2020    NEUTRORELPCT 60.9 03/09/2020    LYMPHORELPCT 29.3 03/09/2020    MONORELPCT 8.4 03/09/2020    EOSRELPCT 0.7 03/09/2020    BASORELPCT 0.6 03/09/2020    AUTOIGPER 0.1 03/09/2020    NEUTROABS 4.20 03/09/2020    LYMPHSABS 2.02 03/09/2020    MONOSABS 0.58 03/09/2020    EOSABS 0.05 03/09/2020    BASOSABS 0.04 03/09/2020    AUTOIGNUM 0.01 03/09/2020    NRBC 0.0 03/09/2020       TSH  Lab Results   Component Value Date    TSH 3.730 04/08/2024    TSH 0.015 (L) 02/08/2021    TSH 2.440 03/09/2020       T4  Lab Results   Component Value Date    FREET4 2.76 (H) 02/08/2021    FREET4 0.96 02/02/2020    FREET4 1.50 08/11/2019     Lab Results   Component Value Date    Z6CXNTJ 6.58 03/09/2020    J7CTJRC 7.10 09/08/2017    A2WFTCV 9.70 01/28/2017       T3  Lab Results   Component Value Date    T3FREE 2.10 02/02/2020    T3FREE 3.01 08/11/2019    T3FREE 3.10 10/20/2016     No results found for: \"G8FIVTU\"    TRAb  No results found for: \"TSURCPAB\"    TPO  No results found for: \"THYROIDAB\"    No valid procedures specified.    Assessment and Plan    Diagnoses and all orders for this visit:    1. Hypothyroidism due to Hashimoto's " thyroiditis (Primary)  Assessment & Plan:  -Clinically euthyroid.  -TFT's today with medication adjustment accordingly.  -Reminded of proper administration including taking 7 pills, once daily, per week on an empty stomach with no missed doses, waiting 30-60 minutes prior to other medications or food.  -Will consider adding on T3.  -Follow-up in 3 months.    Orders:  -     TSH  -     T4, free  -     T3, Free  -     Hemoglobin A1c         Return in about 3 months (around 8/13/2024) for Follow-up appointment. The patient was instructed to contact the clinic with any interval questions or concerns.      This document has been electronically signed by JULI Godfrey  May 13, 2024 09:35 EDT   Endocrinology    Please note that portions of this document were completed with a voice recognition program. Efforts were made to edit the dictations, but occasionally words are mis-transcribed.

## 2024-05-13 NOTE — ASSESSMENT & PLAN NOTE
-Clinically euthyroid.  -TFT's today with medication adjustment accordingly.  -Reminded of proper administration including taking 7 pills, once daily, per week on an empty stomach with no missed doses, waiting 30-60 minutes prior to other medications or food.  -Will consider adding on T3.  -Follow-up in 3 months.

## 2024-05-14 RX ORDER — LIOTHYRONINE SODIUM 5 UG/1
5 TABLET ORAL DAILY
Qty: 30 TABLET | Refills: 11 | Status: SHIPPED | OUTPATIENT
Start: 2024-05-14 | End: 2025-05-14

## 2024-05-16 RX ORDER — LEVOTHYROXINE SODIUM 0.1 MG/1
100 TABLET ORAL DAILY
Qty: 90 TABLET | Refills: 2 | Status: SHIPPED | OUTPATIENT
Start: 2024-05-16

## 2024-06-19 ENCOUNTER — TELEPHONE (OUTPATIENT)
Dept: CARDIOLOGY | Facility: CLINIC | Age: 24
End: 2024-06-19
Payer: COMMERCIAL

## 2024-06-19 NOTE — TELEPHONE ENCOUNTER
Hub to relay.     Called pt to advise her I have moved her apt til 7/24 at 9:15 am due to her test being on 7/1. No answer LM.

## 2024-07-17 ENCOUNTER — TELEPHONE (OUTPATIENT)
Dept: CARDIOLOGY | Facility: CLINIC | Age: 24
End: 2024-07-17
Payer: COMMERCIAL

## 2024-07-17 NOTE — TELEPHONE ENCOUNTER
Hub to relay      Called patient to see if going to have CT angio before appt on 7/24 no answer mail box full

## 2024-09-16 ENCOUNTER — OFFICE VISIT (OUTPATIENT)
Dept: ENDOCRINOLOGY | Facility: CLINIC | Age: 24
End: 2024-09-16
Payer: COMMERCIAL

## 2024-09-16 VITALS
DIASTOLIC BLOOD PRESSURE: 73 MMHG | HEART RATE: 80 BPM | BODY MASS INDEX: 24.5 KG/M2 | OXYGEN SATURATION: 100 % | SYSTOLIC BLOOD PRESSURE: 127 MMHG | WEIGHT: 129.6 LBS

## 2024-09-16 DIAGNOSIS — E06.3 HYPOTHYROIDISM DUE TO HASHIMOTO'S THYROIDITIS: Primary | ICD-10-CM

## 2024-09-16 DIAGNOSIS — E03.8 HYPOTHYROIDISM DUE TO HASHIMOTO'S THYROIDITIS: Primary | ICD-10-CM

## 2024-09-16 LAB
T3FREE SERPL-MCNC: 4.11 PG/ML (ref 2–4.4)
T4 FREE SERPL-MCNC: 1.79 NG/DL (ref 0.92–1.68)
TSH SERPL DL<=0.05 MIU/L-ACNC: 0.34 UIU/ML (ref 0.27–4.2)

## 2024-09-16 PROCEDURE — 84443 ASSAY THYROID STIM HORMONE: CPT | Performed by: NURSE PRACTITIONER

## 2024-09-16 PROCEDURE — 84439 ASSAY OF FREE THYROXINE: CPT | Performed by: NURSE PRACTITIONER

## 2024-09-16 PROCEDURE — 99213 OFFICE O/P EST LOW 20 MIN: CPT | Performed by: NURSE PRACTITIONER

## 2024-09-16 PROCEDURE — 84481 FREE ASSAY (FT-3): CPT | Performed by: NURSE PRACTITIONER

## 2024-09-17 RX ORDER — LEVOTHYROXINE SODIUM 88 UG/1
88 TABLET ORAL DAILY
Qty: 90 TABLET | Refills: 1 | Status: SHIPPED | OUTPATIENT
Start: 2024-09-17

## 2024-09-17 RX ORDER — LIOTHYRONINE SODIUM 5 UG/1
5 TABLET ORAL DAILY
Qty: 30 TABLET | Refills: 11 | Status: SHIPPED | OUTPATIENT
Start: 2024-09-17 | End: 2025-09-17

## 2024-12-18 RX ORDER — LEVOTHYROXINE SODIUM 88 UG/1
88 TABLET ORAL DAILY
Qty: 90 TABLET | Refills: 1 | Status: SHIPPED | OUTPATIENT
Start: 2024-12-18

## 2025-03-17 ENCOUNTER — OFFICE VISIT (OUTPATIENT)
Dept: ENDOCRINOLOGY | Facility: CLINIC | Age: 25
End: 2025-03-17
Payer: COMMERCIAL

## 2025-03-17 VITALS
HEART RATE: 67 BPM | BODY MASS INDEX: 25.63 KG/M2 | SYSTOLIC BLOOD PRESSURE: 126 MMHG | DIASTOLIC BLOOD PRESSURE: 71 MMHG | WEIGHT: 135.6 LBS | OXYGEN SATURATION: 98 %

## 2025-03-17 DIAGNOSIS — E06.3 HYPOTHYROIDISM DUE TO HASHIMOTO'S THYROIDITIS: Primary | ICD-10-CM

## 2025-03-17 LAB
T3FREE SERPL-MCNC: 3.1 PG/ML (ref 2–4.4)
T4 FREE SERPL-MCNC: 1.27 NG/DL (ref 0.92–1.68)
TSH SERPL DL<=0.05 MIU/L-ACNC: 0.83 UIU/ML (ref 0.27–4.2)

## 2025-03-17 PROCEDURE — 84481 FREE ASSAY (FT-3): CPT | Performed by: NURSE PRACTITIONER

## 2025-03-17 PROCEDURE — 84439 ASSAY OF FREE THYROXINE: CPT | Performed by: NURSE PRACTITIONER

## 2025-03-17 PROCEDURE — 84443 ASSAY THYROID STIM HORMONE: CPT | Performed by: NURSE PRACTITIONER

## 2025-03-17 NOTE — PROGRESS NOTES
Chief Complaint   Patient presents with    Thyroid Problem     F/u        Referring Provider  No ref. provider found     HPI   Tayla aLntigua is a 24 y.o. female had concerns including Thyroid Problem (F/u).   Hypothyroidism due to Hashimoto's Thyroiditis.    She is currently taking T4 88 mcg and Cytomel 5 mcg QD. She is taking this regularly without missing doses.  She denies any changes to her neck.  She is not taking any conflicting medication.  She reports that she is feeling better since starting on this.    History:  She has had thyroid problems since elementary school.  She was noted at that time to have labs at that time and was noted to have TSH over 400.  Was seen by [facility] and was confirmed.  She has been on medication since then.  She is continued on medication since the age of 7-8.  She does have Hashmioto's thyroiditis.  She's is currently taking Synthroid 88 mcg QD. She is taking this regularly without missing doses.  Denies any changes to her neck without compressive s/sx's.  She had an US Thyroid 12/2023  FINDINGS:    LEFT THYROID LOBE:  Unremarkable.  No enlarged or calcified nodules.    RIGHT THYROID LOBE:  Unremarkable.  No enlarged or calcified nodules.    ISTHMUS:  Unremarkable.  No enlarged or calcified nodules.    LYMPH NODES:  Unremarkable.  No lymphadenopathy.  IMPRESSION:  No acute thyroid findings.    Birth state:[place]  Previous history of radiation to face/neck: none  Consuming foods high in iodine: none  Family history of thyroid complications:maternal grandmother, maternal grandfather, paternal grandmother, mother had a pituitary tumor.    The following portions of the patient's history were reviewed and updated as appropriate: allergies, current medications, past family history, past medical history, past social history, past surgical history, and problem list.    Past Medical History:   Diagnosis Date    Disease of thyroid gland     Essential hypertension 03/11/2024    Hashimoto's  thyroiditis     Hypothyroidism      History reviewed. No pertinent surgical history.   Family History   Problem Relation Age of Onset    Heart disease Maternal Grandfather     Heart attack Paternal Grandfather     Heart disease Paternal Grandfather     Cancer Maternal Grandmother       Social History     Socioeconomic History    Marital status: Single   Tobacco Use    Smoking status: Former     Passive exposure: Past    Tobacco comments:     vapes   Vaping Use    Vaping status: Every Day    Substances: Nicotine, Flavoring    Devices: Pre-filled or refillable cartridge    Passive vaping exposure: Yes   Substance and Sexual Activity    Alcohol use: Never    Drug use: Never    Sexual activity: Defer      Allergies   Allergen Reactions    Bactrim [Sulfamethoxazole-Trimethoprim] Rash      Current Outpatient Medications on File Prior to Visit   Medication Sig Dispense Refill    levothyroxine (SYNTHROID, LEVOTHROID) 88 MCG tablet Take 1 tablet by mouth Daily. 90 tablet 1    liothyronine (CYTOMEL) 5 MCG tablet Take 1 tablet by mouth Daily. 30 tablet 11     No current facility-administered medications on file prior to visit.      Review of Systems   Constitutional:  Positive for diaphoresis, fatigue and unexpected weight gain.   Eyes:  Positive for blurred vision.   Endocrine: Positive for cold intolerance.   Genitourinary:  Negative for menstrual problem.   Musculoskeletal:  Positive for myalgias.   Skin:  Negative for dry skin.        Hair loss, no nail changes.   Psychiatric/Behavioral:  Negative for sleep disturbance.    All other systems reviewed and are negative.  Symptoms are improving.    /71 (BP Location: Right arm, Patient Position: Sitting, Cuff Size: Adult)   Pulse 67   Wt 61.5 kg (135 lb 9.6 oz)   LMP 03/03/2025 (Approximate)   SpO2 98%   Breastfeeding No   BMI 25.63 kg/m²      Physical Exam  Vitals reviewed.   Constitutional:       Appearance: Normal appearance.   Eyes:      Extraocular Movements:  Extraocular movements intact.   Neck:      Thyroid: No thyroid mass, thyromegaly or thyroid tenderness.   Cardiovascular:      Rate and Rhythm: Normal rate.   Pulmonary:      Effort: Pulmonary effort is normal.   Neurological:      General: No focal deficit present.      Mental Status: She is alert and oriented to person, place, and time.   Psychiatric:         Mood and Affect: Mood normal.         Behavior: Behavior normal.         Thought Content: Thought content normal.         Judgment: Judgment normal.       Labs/Imaging  CMP  Lab Results   Component Value Date    GLUCOSE 106 (H) 04/08/2024    BUN 12 04/08/2024    CREATININE 0.99 04/08/2024    EGFRIFNONA 110 03/09/2020    BCR 12.1 04/08/2024    K 3.9 04/08/2024    CO2 15.5 (L) 04/08/2024    CALCIUM 9.0 04/08/2024    ALBUMIN 4.9 04/08/2024    AST 19 04/08/2024    ALT 9 04/08/2024        CBC w/DIFF   Lab Results   Component Value Date    WBC 6.90 03/09/2020    RBC 4.28 03/09/2020    HGB 12.1 03/09/2020    HCT 37.2 03/09/2020    MCV 86.9 03/09/2020    MCH 28.3 03/09/2020    MCHC 32.5 03/09/2020    RDW 12.2 (L) 03/09/2020    RDWSD 38.5 03/09/2020    MPV 10.0 03/09/2020     03/09/2020    NEUTRORELPCT 60.9 03/09/2020    LYMPHORELPCT 29.3 03/09/2020    MONORELPCT 8.4 03/09/2020    EOSRELPCT 0.7 03/09/2020    BASORELPCT 0.6 03/09/2020    AUTOIGPER 0.1 03/09/2020    NEUTROABS 4.20 03/09/2020    LYMPHSABS 2.02 03/09/2020    MONOSABS 0.58 03/09/2020    EOSABS 0.05 03/09/2020    BASOSABS 0.04 03/09/2020    AUTOIGNUM 0.01 03/09/2020    NRBC 0.0 03/09/2020       TSH  Lab Results   Component Value Date    TSH 0.828 03/17/2025    TSH 0.337 09/16/2024    TSH 7.500 (H) 05/13/2024       T4  Lab Results   Component Value Date    FREET4 1.27 03/17/2025    FREET4 1.79 (H) 09/16/2024    FREET4 1.19 05/13/2024     Lab Results   Component Value Date    Y4KCVHF 6.58 03/09/2020    P5WFINA 7.10 09/08/2017    R2NWBCM 9.70 01/28/2017       T3  Lab Results   Component Value Date     "T3FREE 3.10 03/17/2025    T3FREE 4.11 09/16/2024    T3FREE 2.53 05/13/2024     No results found for: \"V7EJJTM\"    TRAb  No results found for: \"TSURCPAB\"    TPO  No results found for: \"THYROIDAB\"    No valid procedures specified.    Assessment and Plan    Diagnoses and all orders for this visit:    1. Hypothyroidism due to Hashimoto's thyroiditis (Primary)  Assessment & Plan:  -Clinically euthyroid.  -TFT's today with medication adjustment accordingly.  -Reminded of proper administration including taking 7 pills, once daily, per week on an empty stomach with no missed doses, waiting 30-60 minutes prior to other medications or food.  -Follow-up in 6 months.    Orders:  -     TSH  -     T4, free  -     T3, Free             Return in about 6 months (around 9/17/2025) for Follow-up appointment. The patient was instructed to contact the clinic with any interval questions or concerns.      This document has been electronically signed by JULI Godfrey  March 18, 2025 10:06 EDT   Endocrinology    Please note that portions of this document were completed with a voice recognition program. Efforts were made to edit the dictations, but occasionally words are mis-transcribed.   "